# Patient Record
Sex: FEMALE | Race: WHITE | NOT HISPANIC OR LATINO | Employment: OTHER | URBAN - METROPOLITAN AREA
[De-identification: names, ages, dates, MRNs, and addresses within clinical notes are randomized per-mention and may not be internally consistent; named-entity substitution may affect disease eponyms.]

---

## 2019-02-21 PROBLEM — C84.A0 CUTANEOUS LYMPHOMA: Status: ACTIVE | Noted: 2019-02-21

## 2019-02-21 NOTE — PROGRESS NOTES
[facility]  History & Physical    Subjective:      Patient ID:   NAME: Phillip Licea : 1960     59 y.o. female    Referring Doc: Angel  Other Physicians: Char Scott (PCP-[place])        Chief Complaint: cutaneous atypical lymphoid skin biopsy       HPI:  59 y.o. female with diagnosis of cutaneous atypical lymphoid skin biopsy  who has been referred by Dr Pruitt/Kip for evaluation by medical oncology. She had a history of actinic keratosis s/p cryotherapy in past. She had a recent biopsy on 2018 from her right upper back which came back showing a CD30+ lymphoproliferative disorder. This could be indicative of an underlying primary cutaneous anaplastic large cell lymphoma, but could also just be lymphomatoid papulosis or even a reactive process. She had the area excised by dermatology. She had basal cell carcinoma on her face before on left cheek which was treated several years ago.       ROS:   GEN: normal without any fever, night sweats or weight loss  HEENT: normal with no HA's, sore throat, stiff neck, changes in vision  CV: normal with no CP, SOB, PND, LITTLE or orthopnea  PULM: normal with no SOB, cough, hemoptysis, sputum or pleuritic pain  GI: normal with no abdominal pain, nausea, vomiting, constipation, diarrhea, melanotic stools, BRBPR, or hematemesis  : normal with no hematuria, dysuria  BREAST: normal with no mass, discharge, pain  SKIN: normal with no rash, erythema, bruising, or swelling       Past Medical/Surgical History:  Past Medical History:   Diagnosis Date    Cutaneous lymphoma 2019     History reviewed. No pertinent surgical history.      Allergies:  Review of patient's allergies indicates:  Allergies not on file    Social/Family History:  Social History     Socioeconomic History    Marital status:      Spouse name: Not on file    Number of children: Not on file    Years of education: Not on file    Highest education level: Not on  "file   Social Needs    Financial resource strain: Not on file    Food insecurity - worry: Not on file    Food insecurity - inability: Not on file    Transportation needs - medical: Not on file    Transportation needs - non-medical: Not on file   Occupational History    Not on file   Tobacco Use    Smoking status: Current Every Day Smoker    Smokeless tobacco: Never Used   Substance and Sexual Activity    Alcohol use: No     Frequency: Never    Drug use: No    Sexual activity: Not on file   Other Topics Concern    Not on file   Social History Narrative    Not on file     History reviewed. No pertinent family history.      Medications:    Current Outpatient Medications:     atorvastatin (LIPITOR) 10 MG tablet, , Disp: , Rfl:     carBAMazepine (TEGRETOL) 200 mg tablet, , Disp: , Rfl:       Pathology:  Cancer Staging  No matching staging information was found for the patient.      Objective:   Vitals:  Blood pressure 102/72, pulse 85, temperature 98.3 °F (36.8 °C), resp. rate 20, height 5' 7" (1.702 m), weight 93.4 kg (205 lb 12.8 oz).    Physical Examination:   GEN: no apparent distress, comfortable; AAOx3  HEAD: atraumatic and normocephalic  EYES: no pallor, no icterus, PERRLA  ENT: OMM, no pharyngeal erythema, external ears WNL; no nasal discharge; no thrush  NECK: no masses, thyroid normal, trachea midline, no LAD/LN's, supple  CV: RRR with no murmur; normal pulse; normal S1 and S2; no pedal edema  CHEST: Normal respiratory effort; CTAB; normal breath sounds; no wheeze or crackles  ABDOM: nontender and nondistended; soft; normal bowel sounds; no rebound/guarding  MUSC/Skeletal: ROM normal; no crepitus; joints normal; no deformities or arthropathy  EXTREM: no clubbing, cyanosis, inflammation or swelling  SKIN: no rashes, lesions, ulcers, petechiae or subcutaneous nodules; well healed scar on upper part of right shoulder  : no hurt  NEURO: grossly intact; motor/sensory WNL; AAOx3; no tremors  PSYCH: " normal mood, affect and behavior  LYMPH: normal cervical, supraclavicular, axillary and groin LN's      Labs:       none    Radiology/Diagnostic Studies:          All lab results and imaging results have been reviewed and discussed with the patient    Assessment:   (1) 59 y.o. female with diagnosis of cutaneous atypical lymphoid skin biopsy  who has been referred by Dr Pruitt/Kip for evaluation by medical oncology. She had a history of actinic keratosis s/p cryotherapy in past.   - She had a recent biopsy on 11/13/2018 from her right upper back which came back showing a CD30+ lymphoproliferative disorder.   - This could be indicative of an underlying primary cutaneous anaplastic large cell lymphoma, but could also just be lymphomatoid papulosis or even a reactive process      (2) Hypercholesterolemia    (3) bipolar disorder - on tegretol      VISIT DIAGNOSES:              Cutaneous T-cell lymphoma, unspecified body region            Plan:     PLAN:  1. Schedule PET scan  2. Refer to Dr Smith with [facility]  3. Check up to date labs  4. F/u with PCP and Derm  5. Will need continued dermatologic surveillance  RTC in  3-4 weeks   Fax note to Char Scott,Amanda Pruitt        I have explained and the patient understands all of  the current recommendation(s). I have answered all of their questions to the best of my ability and to their complete satisfaction.             Thank you for allowing me to participate in this patient's care. Please call with any questions or concerns.    Electronically signed Jacob Corbett MD

## 2019-02-22 ENCOUNTER — OFFICE VISIT (OUTPATIENT)
Dept: HEMATOLOGY/ONCOLOGY | Facility: CLINIC | Age: 59
End: 2019-02-22
Payer: MEDICARE

## 2019-02-22 VITALS
TEMPERATURE: 98 F | RESPIRATION RATE: 20 BRPM | HEART RATE: 85 BPM | SYSTOLIC BLOOD PRESSURE: 102 MMHG | WEIGHT: 205.81 LBS | DIASTOLIC BLOOD PRESSURE: 72 MMHG | BODY MASS INDEX: 32.3 KG/M2 | HEIGHT: 67 IN

## 2019-02-22 DIAGNOSIS — C84.A1: ICD-10-CM

## 2019-02-22 DIAGNOSIS — C84.A0 CUTANEOUS T-CELL LYMPHOMA, UNSPECIFIED BODY REGION: ICD-10-CM

## 2019-02-22 PROCEDURE — 3008F BODY MASS INDEX DOCD: CPT | Mod: ,,, | Performed by: INTERNAL MEDICINE

## 2019-02-22 PROCEDURE — 99204 PR OFFICE/OUTPT VISIT, NEW, LEVL IV, 45-59 MIN: ICD-10-PCS | Mod: ,,, | Performed by: INTERNAL MEDICINE

## 2019-02-22 PROCEDURE — 3008F PR BODY MASS INDEX (BMI) DOCUMENTED: ICD-10-PCS | Mod: ,,, | Performed by: INTERNAL MEDICINE

## 2019-02-22 PROCEDURE — 99204 OFFICE O/P NEW MOD 45 MIN: CPT | Mod: ,,, | Performed by: INTERNAL MEDICINE

## 2019-02-22 RX ORDER — CARBAMAZEPINE 200 MG/1
TABLET ORAL
COMMUNITY
Start: 2019-01-30

## 2019-02-22 RX ORDER — ATORVASTATIN CALCIUM 10 MG/1
TABLET, FILM COATED ORAL
COMMUNITY
Start: 2019-01-28

## 2019-02-22 NOTE — LETTER
February 22, 2019      Char Scott MD  [address] [facility]  [place] [address]           [facility] - Hematology Oncology  [address]  [address]  [place] [address]  Phone: [phone]  Fax: [phone]          Patient: Phillip Licea   MR Number: [id number]   YOB: 1960   Date of Visit: 2/22/2019       Dear Dr. Char Scott:    Thank you for referring Phillip Licea to me for evaluation. Attached you will find relevant portions of my assessment and plan of care.    If you have questions, please do not hesitate to call me. I look forward to following Phillip Licea along with you.    Sincerely,    Jacob Corbett MD    Enclosure  CC:  No Recipients    If you would like to receive this communication electronically, please contact [email or web address] or [phone] to request more information on [email or web address] Link access.    For providers and/or their staff who would like to refer a patient to [facility], please contact us through our one-stop-shop provider referral line, [facility], at [phone].    If you feel you have received this communication in error or would no longer like to receive these types of communications, please e-mail [email or web address]

## 2019-03-13 LAB
ALBUMIN SERPL-MCNC: 4 G/DL (ref 3.1–4.7)
ALP SERPL-CCNC: 91 IU/L (ref 40–104)
ALT (SGPT): 16 IU/L (ref 3–33)
AST SERPL-CCNC: 19 IU/L (ref 10–40)
BASOPHILS NFR BLD: 0.1 K/UL (ref 0–0.2)
BASOPHILS NFR BLD: 1 %
BILIRUB SERPL-MCNC: 0.3 MG/DL (ref 0.3–1)
BUN SERPL-MCNC: 27 MG/DL (ref 8–20)
CALCIUM SERPL-MCNC: 9.4 MG/DL (ref 7.7–10.4)
CHLORIDE: 101 MMOL/L (ref 98–110)
CO2 SERPL-SCNC: 28.1 MMOL/L (ref 22.8–31.6)
CREATININE: 0.47 MG/DL (ref 0.6–1.4)
EOSINOPHIL NFR BLD: 0.2 K/UL (ref 0–0.7)
EOSINOPHIL NFR BLD: 2.4 %
ERYTHROCYTE [DISTWIDTH] IN BLOOD BY AUTOMATED COUNT: 13.2 % (ref 11.7–14.9)
GLUCOSE: 90 MG/DL (ref 70–99)
GRAN #: 3.2 K/UL (ref 1.4–6.5)
GRAN%: 45 %
HCT VFR BLD AUTO: 45.8 % (ref 36–48)
HGB BLD-MCNC: 14.6 G/DL (ref 12–15)
IMMATURE GRANS (ABS): 0.1 K/UL (ref 0–1)
IMMATURE GRANULOCYTES: 1 %
LYMPH #: 3.1 K/UL (ref 1.2–3.4)
LYMPH%: 42.9 %
MCH RBC QN AUTO: 28.9 PG (ref 25–35)
MCHC RBC AUTO-ENTMCNC: 31.9 G/DL (ref 31–36)
MCV RBC AUTO: 90.5 FL (ref 79–98)
MONO #: 0.6 K/UL (ref 0.1–0.6)
MONO%: 7.7 %
NUCLEATED RBCS: 0 %
PLATELET # BLD AUTO: 303 K/UL (ref 140–440)
PMV BLD AUTO: 10.2 FL (ref 8.8–12.7)
POTASSIUM SERPL-SCNC: 4.1 MMOL/L (ref 3.5–5)
PROT SERPL-MCNC: 7.3 G/DL (ref 6–8.2)
RBC # BLD AUTO: 5.06 M/UL (ref 3.5–5.5)
SODIUM: 138 MMOL/L (ref 134–144)
WBC # BLD AUTO: 7.2 K/UL (ref 5–10)

## 2019-03-15 ENCOUNTER — TELEPHONE (OUTPATIENT)
Dept: HEMATOLOGY/ONCOLOGY | Facility: CLINIC | Age: 59
End: 2019-03-15

## 2019-03-15 NOTE — TELEPHONE ENCOUNTER
Patient coming in on 28 th can get results at that time     ----- Message from Jacob Corbett MD sent at 3/15/2019  9:27 AM CDT -----  Give her the report; overall it sounds adquate

## 2019-06-02 NOTE — PROGRESS NOTES
[facility] Hematology/Oncology  PROGRESS NOTE - 2nd Follow-up Visit      Subjective:       Patient ID:   NAME: Phillip Licea : 1960     59 y.o. female    Referring Doc:  Angel  Other Physicians: Char Scott (PCP-[place])    Chief Complaint:  cutaneous atypical lymphoid skin biopsy f/u    History of Present Illness:     Patient returns today for a 2nd regularly scheduled follow-up visit.  The patient is here today to go over the results of the recently ordered labs, tests and studies. She had a PEt scan on 3/15/2019. She has missed several appointments with my service since the initial visit. She was referred to see Dr Smith at [facility] who is a lymphoma specialist but she cancelled that appointment and is now declining to go see her. She is here with her . She is feeling pretty good. She has yet to f/u with Dr Pruitt. She denies any CP, SOB, HA's or N/V. No fevers, weight loss or night sweats. She has a lot of sinus drainage issues. She saw Sonia recently.             ROS:   GEN: normal without any fever, night sweats or weight loss  HEENT: normal with no HA's, sore throat, stiff neck, changes in vision  CV: normal with no CP, SOB, PND, LITTLE or orthopnea  PULM: normal with no SOB, cough, hemoptysis, sputum or pleuritic pain  GI: normal with no abdominal pain, nausea, vomiting, constipation, diarrhea, melanotic stools, BRBPR, or hematemesis  : normal with no hematuria, dysuria  BREAST: normal with no mass, discharge, pain  SKIN: normal with no rash, erythema, bruising, or swelling    Allergies:  Review of patient's allergies indicates:  No Known Allergies    Medications:    Current Outpatient Medications:     atorvastatin (LIPITOR) 10 MG tablet, , Disp: , Rfl:     carBAMazepine (TEGRETOL) 200 mg tablet, , Disp: , Rfl:     PMHx/PSHx Updates:  See patient's last visit with me on 2019.  See H&P on 2019        Pathology:  Cancer Staging  No matching staging information was found  for the patient.          Objective:     Vitals:  Blood pressure (!) 141/85, pulse (!) 59, temperature 98.5 °F (36.9 °C), temperature source Oral, resp. rate 20, weight 98.3 kg (216 lb 11.2 oz).    Physical Examination:   GEN: no apparent distress, comfortable; AAOx3  HEAD: atraumatic and normocephalic  EYES: no pallor, no icterus, PERRLA  ENT: OMM, no pharyngeal erythema, external ears WNL; no nasal discharge; no thrush  NECK: no masses, thyroid normal, trachea midline, no LAD/LN's, supple  CV: RRR with no murmur; normal pulse; normal S1 and S2; no pedal edema  CHEST: Normal respiratory effort; CTAB; normal breath sounds; no wheeze or crackles  ABDOM: nontender and nondistended; soft; normal bowel sounds; no rebound/guarding  MUSC/Skeletal: ROM normal; no crepitus; joints normal; no deformities or arthropathy  EXTREM: no clubbing, cyanosis, inflammation or swelling  SKIN: no rashes, lesions, ulcers, petechiae or subcutaneous nodules; well healed scar on upper part of right shoulder  : no hurt  NEURO: grossly intact; motor/sensory WNL; AAOx3; no tremors  PSYCH: normal mood, affect and behavior  LYMPH: normal cervical, supraclavicular, axillary and groin LN's          Labs:     3/13/2019 on chart  Lab Results   Component Value Date    WBC 7.2 03/13/2019    HGB 14.6 03/13/2019    HCT 45.8 03/13/2019    MCV 90.5 03/13/2019     03/13/2019     CMP  Sodium   Date Value Ref Range Status   03/13/2019 138 134 - 144 mmol/L      Potassium   Date Value Ref Range Status   03/13/2019 4.1 3.5 - 5.0 mmol/L      Chloride   Date Value Ref Range Status   03/13/2019 101 98 - 110 mmol/L      CO2   Date Value Ref Range Status   03/13/2019 28.1 22.8 - 31.6 mmol/L      Glucose   Date Value Ref Range Status   03/13/2019 90 70 - 99 mg/dL      BUN, Bld   Date Value Ref Range Status   03/13/2019 27 (H) 8 - 20 mg/dL      Creatinine   Date Value Ref Range Status   03/13/2019 0.47 (L) 0.60 - 1.40 mg/dL      Calcium   Date Value Ref  Range Status   03/13/2019 9.4 7.7 - 10.4 mg/dL      Total Protein   Date Value Ref Range Status   03/13/2019 7.3 6.0 - 8.2 g/dL      Albumin   Date Value Ref Range Status   03/13/2019 4.0 3.1 - 4.7 g/dL      Total Bilirubin   Date Value Ref Range Status   03/13/2019 0.3 0.3 - 1.0 mg/dL      Alkaline Phosphatase   Date Value Ref Range Status   03/13/2019 91 40 - 104 IU/L      AST   Date Value Ref Range Status   03/13/2019 19 10 - 40 IU/L            Radiology/Diagnostic Studies:    PET   3/13/2019:    IMPRESSION:  1. Right paratracheal node, just above normal for size with minimally increased  FDG activity from background suggestive of a reactive/inflammatory node.  2. Otherwise, no focal increased FDG activity to specifically suggest  malignancy      I have reviewed all available lab results and radiology reports.    Assessment/Plan:   (1) 59 y.o. female with diagnosis of cutaneous atypical lymphoid skin biopsy  who has been referred by Dr Pruitt/Kip for evaluation by medical oncology. She had a history of actinic keratosis s/p cryotherapy in past.   - She had a recent biopsy on 11/13/2018 from her right upper back which came back showing a CD30+ lymphoproliferative disorder.   - This could be indicative of an underlying primary cutaneous anaplastic large cell lymphoma, but could also just be lymphomatoid papulosis or even a reactive process  - PET scan was done on 3/13/2019 by the patient and overall there are no FDG activities to suggest malignanay - she had a small right paratracheal LN which was just above the normal size with some mild FDG uptake most suggestive of raective or inflammatory process  - patient was supposed to see Dr Smith at [facility] ( a lymphoma specialist) but patient cancelled that appointment and is now declining to go see her        (2) Hypercholesterolemia     (3) bipolar disorder - on tegretol    (4) Noncompliance with follow-up and referrals             VISIT DIAGNOSES:      Cutaneous  T-cell lymphoma, unspecified body region          PLAN:  1. F/u with her PCP and recommend regular dermatologic survelillance  2. Recommend ed referral to Dr Smith at [facility] but she is now declining to do so  3. RTC in 6 months   Fax note to Char Pruitt MD, and Sarah    Discussion:       I spent over 25 mins of time with the patient. Reviewed results of the recently ordered labs, tests and studies; made directives with regards to the results. Over half of this time was spent couseling and coordinating care.    I have explained all of the above in detail and the patient understands all of the current recommendation(s). I have answered all of their questions to the best of my ability and to their complete satisfaction.   The patient is to continue with the current management plan.            Electronically signed by Jacob Corbett MD         no chills

## 2019-06-03 ENCOUNTER — OFFICE VISIT (OUTPATIENT)
Dept: HEMATOLOGY/ONCOLOGY | Facility: CLINIC | Age: 59
End: 2019-06-03
Payer: MEDICARE

## 2019-06-03 VITALS
HEART RATE: 59 BPM | DIASTOLIC BLOOD PRESSURE: 85 MMHG | TEMPERATURE: 99 F | BODY MASS INDEX: 33.94 KG/M2 | RESPIRATION RATE: 20 BRPM | SYSTOLIC BLOOD PRESSURE: 141 MMHG | WEIGHT: 216.69 LBS

## 2019-06-03 DIAGNOSIS — C84.A0 CUTANEOUS T-CELL LYMPHOMA, UNSPECIFIED BODY REGION: Primary | ICD-10-CM

## 2019-06-03 PROCEDURE — 99215 PR OFFICE/OUTPT VISIT, EST, LEVL V, 40-54 MIN: ICD-10-PCS | Mod: ,,, | Performed by: INTERNAL MEDICINE

## 2019-06-03 PROCEDURE — 3008F PR BODY MASS INDEX (BMI) DOCUMENTED: ICD-10-PCS | Mod: ,,, | Performed by: INTERNAL MEDICINE

## 2019-06-03 PROCEDURE — 99215 OFFICE O/P EST HI 40 MIN: CPT | Mod: ,,, | Performed by: INTERNAL MEDICINE

## 2019-06-03 PROCEDURE — 3008F BODY MASS INDEX DOCD: CPT | Mod: ,,, | Performed by: INTERNAL MEDICINE
